# Patient Record
Sex: MALE | Race: WHITE | Employment: STUDENT | URBAN - METROPOLITAN AREA
[De-identification: names, ages, dates, MRNs, and addresses within clinical notes are randomized per-mention and may not be internally consistent; named-entity substitution may affect disease eponyms.]

---
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
## 2023-01-01 NOTE — PROGRESS NOTES
Subjective:        History was provided by the patient and mother. Ernesto Zaldivar is a 18 y.o. male who is brought in by his mother for this well-child visit. Patient's medications, allergies, past medical, surgical, social and family histories were reviewed and updated as appropriate. Immunization History   Administered Date(s) Administered    DTaP 2002, 2002, 2002, 10/31/2003, 07/10/2007    Hepatitis B, unspecified formulation 2002, 2002, 2003, 10/31/2003    Hib, unspecified formulation 2002, 2002    IPV (Ipol) 2002, 2002, 2002, 2003, 07/10/2007    MMR 2003, 07/10/2007    Meningococcal MCV4P (Menactra) 2014    Pneumococcal Conjugate 7-valent 2002, 2002, 2002, 2003    Tdap (Boostrix, Adacel) 2014    Varicella (Varivax) 2003, 07/10/2007       Current Issues:  Current concerns include had wisdom teeth removed last week. Gets braces in a couple weeks. Does patient snore? yes - mouth breathing (95% blocked septum)     Review of Nutrition:  Current diet: regular  Balanced diet? yes  Current dietary habits: regular    Social Screening:   Parental relations: good  Sibling relations: brothers: 1 older and sisters: 1 older  Discipline concerns? no  Concerns regarding behavior with peers? no  School performance: doing well; no concerns  Secondhand smoke exposure? no   Regular visit with dentist? no  Sleep problems? no Hours of sleep: 8  History of SOB/Chest pain/dizziness with activity? no  Family history of early death or MI before age 48? yes - paternal grandfather MI at age 39.  at age 46.     Vision and Hearing Screening:     No results for this visit       ROS:    Constitutional:  Negative for fatigue  HENT:  Negative for congestion, rhinitis, sore throat, normal hearing  Eyes:  No vision issues  Resp:  Negative for SOB, wheezing, cough  Cardiovascular: Negative for CP, Gastrointestinal: Negative for abd pain and N/V, normal BMs  :  Negative for dysuria and enuresis   Musculoskeletal:  Negative for myalgias  Skin: Negative for rash, change in moles, and sunburn. Acne:cheeks, forehead and nose   Neuro:  Negative for dizziness, headache, syncopal episodes  Psych: negative for depression or anxiety    Objective:         Vitals:    06/17/19 1020   BP: 100/60   Pulse: 74   Temp: 97.5 °F (36.4 °C)   TempSrc: Oral   SpO2: 98%   Weight: 150 lb (68 kg)   Height: 5' 9.2\" (1.758 m)     Growth parameters are noted and are appropriate for age. Vision screening done? no    General:   alert, appears stated age and cooperative   Gait:   normal   Skin:   normal   Oral cavity:   lips, mucosa, and tongue normal; teeth and gums normal   Eyes:   sclerae white, pupils equal and reactive, red reflex normal bilaterally   Ears:   normal bilaterally   Neck:   no adenopathy, no carotid bruit, no JVD, supple, symmetrical, trachea midline and thyroid not enlarged, symmetric, no tenderness/mass/nodules   Lungs:  clear to auscultation bilaterally   Heart:   regular rate and rhythm, S1, S2 normal, no murmur, click, rub or gallop   Abdomen:  soft, non-tender; bowel sounds normal; no masses,  no organomegaly   :  exam deferred   [place] Stage:      Extremities:  extremities normal, atraumatic, no cyanosis or edema   Neuro:  normal without focal findings, mental status, speech normal, alert and oriented x3, DENI and reflexes normal and symmetric       Assessment:       Well adolescent exam.       Plan: Mother educated on normal growth and development  Mother educated about vaccine schedule  ----Vaccines up-to-date  Mother educated about nutrition  Mother educated about dental care    All questions answered. No concerns at this time.       Preventive Plan/anticipatory guidance: Discussed the following with patient and parent(s)/guardian and educational materials provided:     [x] Nutrition/feeding- eat 5 fruits/veg daily, limit fried foods, fast food, junk food and sugary drinks, Drink water or fat free milk (20-24 ounces daily to get recommended calcium)   []  Participate in > 1 hour of physical activity or active play daily   []  Effects of second hand smoke   []  Avoid direct sunlight, sun protective clothing, sunscreen   [x]  Safety in the car: Seatbelt use, never enter car if  is under the influence of alcohol or drugs, once one earns their license: never using phone/texting while driving   []  Bicycle helmet use   []  Importance of caring/supportive relationships with family and friends   []  Importance of reporting bullying, stalking, abuse, and any threat to one's safety ASAP   []  Importance of appropriate sleep amount and sleep hygiene   []  Importance of responsibility with school work; impact on one's future   []  Conflict resolution should always be non-violent   []  Internet safety and cyberbullying   []  Hearing protection at loud concerts to prevent permanent hearing loss   [x]  Proper dental care. If no fluoride in water, need for oral fluoride supplementation   []  Signs of depression and anxiety;  Importance of reaching out for help if one ever develops these signs   [x]  Age/experience appropriate counseling concerning sexual, STD and pregnancy prevention, peer pressure, drug/alcohol/tobacco use, prevention strategy: to prevent making decisions one will later regret   []  Smoke alarms/carbon monoxide detectors   []  Firearms safety: parents keep firearms locked up and unloaded   [x]  Normal development   [x]  When to call   [x]  Well child visit schedule Statement Selected